# Patient Record
Sex: MALE | Race: ASIAN | NOT HISPANIC OR LATINO | URBAN - METROPOLITAN AREA
[De-identification: names, ages, dates, MRNs, and addresses within clinical notes are randomized per-mention and may not be internally consistent; named-entity substitution may affect disease eponyms.]

---

## 2024-01-21 ENCOUNTER — EMERGENCY (EMERGENCY)
Facility: HOSPITAL | Age: 29
LOS: 1 days | Discharge: HOME | End: 2024-01-21
Attending: EMERGENCY MEDICINE
Payer: MEDICAID

## 2024-01-21 VITALS
RESPIRATION RATE: 18 BRPM | OXYGEN SATURATION: 100 % | DIASTOLIC BLOOD PRESSURE: 67 MMHG | TEMPERATURE: 98 F | HEIGHT: 69 IN | HEART RATE: 110 BPM | WEIGHT: 154.98 LBS | SYSTOLIC BLOOD PRESSURE: 127 MMHG

## 2024-01-21 VITALS
HEART RATE: 82 BPM | SYSTOLIC BLOOD PRESSURE: 116 MMHG | DIASTOLIC BLOOD PRESSURE: 74 MMHG | OXYGEN SATURATION: 97 % | RESPIRATION RATE: 20 BRPM | TEMPERATURE: 98 F

## 2024-01-21 LAB
ALBUMIN SERPL ELPH-MCNC: 4.6 G/DL — SIGNIFICANT CHANGE UP (ref 3.3–5)
ALP SERPL-CCNC: 83 U/L — SIGNIFICANT CHANGE UP (ref 40–120)
ALT FLD-CCNC: 30 U/L — SIGNIFICANT CHANGE UP (ref 10–45)
ANION GAP SERPL CALC-SCNC: 14 MMOL/L — SIGNIFICANT CHANGE UP (ref 5–17)
ANISOCYTOSIS BLD QL: SLIGHT — SIGNIFICANT CHANGE UP
APTT BLD: 32.7 SEC — SIGNIFICANT CHANGE UP (ref 24.5–35.6)
AST SERPL-CCNC: 20 U/L — SIGNIFICANT CHANGE UP (ref 10–40)
BASOPHILS # BLD AUTO: 0.07 K/UL — SIGNIFICANT CHANGE UP (ref 0–0.2)
BASOPHILS NFR BLD AUTO: 0.9 % — SIGNIFICANT CHANGE UP (ref 0–2)
BILIRUB SERPL-MCNC: 0.4 MG/DL — SIGNIFICANT CHANGE UP (ref 0.2–1.2)
BLD GP AB SCN SERPL QL: NEGATIVE — SIGNIFICANT CHANGE UP
BUN SERPL-MCNC: 16 MG/DL — SIGNIFICANT CHANGE UP (ref 7–23)
CALCIUM SERPL-MCNC: 9.6 MG/DL — SIGNIFICANT CHANGE UP (ref 8.4–10.5)
CHLORIDE SERPL-SCNC: 102 MMOL/L — SIGNIFICANT CHANGE UP (ref 96–108)
CO2 SERPL-SCNC: 22 MMOL/L — SIGNIFICANT CHANGE UP (ref 22–31)
CREAT SERPL-MCNC: 1.03 MG/DL — SIGNIFICANT CHANGE UP (ref 0.5–1.3)
EGFR: 101 ML/MIN/1.73M2 — SIGNIFICANT CHANGE UP
EOSINOPHIL # BLD AUTO: 0.07 K/UL — SIGNIFICANT CHANGE UP (ref 0–0.5)
EOSINOPHIL NFR BLD AUTO: 0.9 % — SIGNIFICANT CHANGE UP (ref 0–6)
GLUCOSE SERPL-MCNC: 152 MG/DL — HIGH (ref 70–99)
HCT VFR BLD CALC: 42.3 % — SIGNIFICANT CHANGE UP (ref 39–50)
HGB BLD-MCNC: 14.4 G/DL — SIGNIFICANT CHANGE UP (ref 13–17)
HIV 1 & 2 AB SERPL IA.RAPID: SIGNIFICANT CHANGE UP
INR BLD: 1.12 RATIO — SIGNIFICANT CHANGE UP (ref 0.85–1.18)
LYMPHOCYTES # BLD AUTO: 1.89 K/UL — SIGNIFICANT CHANGE UP (ref 1–3.3)
LYMPHOCYTES # BLD AUTO: 24.6 % — SIGNIFICANT CHANGE UP (ref 13–44)
MAGNESIUM SERPL-MCNC: 2.2 MG/DL — SIGNIFICANT CHANGE UP (ref 1.6–2.6)
MANUAL SMEAR VERIFICATION: SIGNIFICANT CHANGE UP
MCHC RBC-ENTMCNC: 24.8 PG — LOW (ref 27–34)
MCHC RBC-ENTMCNC: 34 GM/DL — SIGNIFICANT CHANGE UP (ref 32–36)
MCV RBC AUTO: 72.9 FL — LOW (ref 80–100)
MICROCYTES BLD QL: SLIGHT — SIGNIFICANT CHANGE UP
MONOCYTES # BLD AUTO: 0.81 K/UL — SIGNIFICANT CHANGE UP (ref 0–0.9)
MONOCYTES NFR BLD AUTO: 10.5 % — SIGNIFICANT CHANGE UP (ref 2–14)
NEUTROPHILS # BLD AUTO: 4.65 K/UL — SIGNIFICANT CHANGE UP (ref 1.8–7.4)
NEUTROPHILS NFR BLD AUTO: 60.5 % — SIGNIFICANT CHANGE UP (ref 43–77)
OVALOCYTES BLD QL SMEAR: SLIGHT — SIGNIFICANT CHANGE UP
PHOSPHATE SERPL-MCNC: 3 MG/DL — SIGNIFICANT CHANGE UP (ref 2.5–4.5)
PLAT MORPH BLD: NORMAL — SIGNIFICANT CHANGE UP
PLATELET # BLD AUTO: 328 K/UL — SIGNIFICANT CHANGE UP (ref 150–400)
POIKILOCYTOSIS BLD QL AUTO: SLIGHT — SIGNIFICANT CHANGE UP
POTASSIUM SERPL-MCNC: 4.4 MMOL/L — SIGNIFICANT CHANGE UP (ref 3.5–5.3)
POTASSIUM SERPL-SCNC: 4.4 MMOL/L — SIGNIFICANT CHANGE UP (ref 3.5–5.3)
PROT SERPL-MCNC: 7.8 G/DL — SIGNIFICANT CHANGE UP (ref 6–8.3)
PROTHROM AB SERPL-ACNC: 12.3 SEC — SIGNIFICANT CHANGE UP (ref 9.5–13)
RBC # BLD: 5.8 M/UL — SIGNIFICANT CHANGE UP (ref 4.2–5.8)
RBC # FLD: 13.4 % — SIGNIFICANT CHANGE UP (ref 10.3–14.5)
RBC BLD AUTO: ABNORMAL
RH IG SCN BLD-IMP: POSITIVE — SIGNIFICANT CHANGE UP
SODIUM SERPL-SCNC: 138 MMOL/L — SIGNIFICANT CHANGE UP (ref 135–145)
VARIANT LYMPHS # BLD: 2.6 % — SIGNIFICANT CHANGE UP (ref 0–6)
WBC # BLD: 7.69 K/UL — SIGNIFICANT CHANGE UP (ref 3.8–10.5)
WBC # FLD AUTO: 7.69 K/UL — SIGNIFICANT CHANGE UP (ref 3.8–10.5)

## 2024-01-21 PROCEDURE — 86900 BLOOD TYPING SEROLOGIC ABO: CPT

## 2024-01-21 PROCEDURE — 71045 X-RAY EXAM CHEST 1 VIEW: CPT | Mod: 26

## 2024-01-21 PROCEDURE — 74177 CT ABD & PELVIS W/CONTRAST: CPT | Mod: 26,MA

## 2024-01-21 PROCEDURE — 85730 THROMBOPLASTIN TIME PARTIAL: CPT

## 2024-01-21 PROCEDURE — 96375 TX/PRO/DX INJ NEW DRUG ADDON: CPT | Mod: XU

## 2024-01-21 PROCEDURE — 93005 ELECTROCARDIOGRAM TRACING: CPT

## 2024-01-21 PROCEDURE — 80053 COMPREHEN METABOLIC PANEL: CPT

## 2024-01-21 PROCEDURE — 96374 THER/PROPH/DIAG INJ IV PUSH: CPT | Mod: XU

## 2024-01-21 PROCEDURE — 86301 IMMUNOASSAY TUMOR CA 19-9: CPT

## 2024-01-21 PROCEDURE — 71260 CT THORAX DX C+: CPT | Mod: MA

## 2024-01-21 PROCEDURE — 71260 CT THORAX DX C+: CPT | Mod: 26,MA

## 2024-01-21 PROCEDURE — 83735 ASSAY OF MAGNESIUM: CPT

## 2024-01-21 PROCEDURE — 84100 ASSAY OF PHOSPHORUS: CPT

## 2024-01-21 PROCEDURE — 85610 PROTHROMBIN TIME: CPT

## 2024-01-21 PROCEDURE — 87389 HIV-1 AG W/HIV-1&-2 AB AG IA: CPT

## 2024-01-21 PROCEDURE — 82378 CARCINOEMBRYONIC ANTIGEN: CPT

## 2024-01-21 PROCEDURE — 99285 EMERGENCY DEPT VISIT HI MDM: CPT | Mod: 25

## 2024-01-21 PROCEDURE — 74177 CT ABD & PELVIS W/CONTRAST: CPT | Mod: MA

## 2024-01-21 PROCEDURE — 71045 X-RAY EXAM CHEST 1 VIEW: CPT

## 2024-01-21 PROCEDURE — 86901 BLOOD TYPING SEROLOGIC RH(D): CPT

## 2024-01-21 PROCEDURE — 86850 RBC ANTIBODY SCREEN: CPT

## 2024-01-21 PROCEDURE — 85025 COMPLETE CBC W/AUTO DIFF WBC: CPT

## 2024-01-21 PROCEDURE — 86703 HIV-1/HIV-2 1 RESULT ANTBDY: CPT

## 2024-01-21 PROCEDURE — 99285 EMERGENCY DEPT VISIT HI MDM: CPT

## 2024-01-21 RX ORDER — ONDANSETRON 8 MG/1
4 TABLET, FILM COATED ORAL ONCE
Refills: 0 | Status: COMPLETED | OUTPATIENT
Start: 2024-01-21 | End: 2024-01-21

## 2024-01-21 RX ORDER — SUCRALFATE 1 G
1 TABLET ORAL ONCE
Refills: 0 | Status: COMPLETED | OUTPATIENT
Start: 2024-01-21 | End: 2024-01-21

## 2024-01-21 RX ORDER — SUCRALFATE 1 G
10 TABLET ORAL
Qty: 420 | Refills: 3
Start: 2024-01-21

## 2024-01-21 RX ORDER — ACETAMINOPHEN 500 MG
1000 TABLET ORAL ONCE
Refills: 0 | Status: COMPLETED | OUTPATIENT
Start: 2024-01-21 | End: 2024-01-21

## 2024-01-21 RX ORDER — MORPHINE SULFATE 50 MG/1
4 CAPSULE, EXTENDED RELEASE ORAL ONCE
Refills: 0 | Status: DISCONTINUED | OUTPATIENT
Start: 2024-01-21 | End: 2024-01-21

## 2024-01-21 RX ORDER — ONDANSETRON 8 MG/1
4 TABLET, FILM COATED ORAL ONCE
Refills: 0 | Status: DISCONTINUED | OUTPATIENT
Start: 2024-01-21 | End: 2024-01-21

## 2024-01-21 RX ORDER — SODIUM CHLORIDE 9 MG/ML
1000 INJECTION INTRAMUSCULAR; INTRAVENOUS; SUBCUTANEOUS ONCE
Refills: 0 | Status: COMPLETED | OUTPATIENT
Start: 2024-01-21 | End: 2024-01-21

## 2024-01-21 RX ORDER — OXYCODONE HYDROCHLORIDE 5 MG/1
1 TABLET ORAL
Qty: 12 | Refills: 0
Start: 2024-01-21 | End: 2024-01-23

## 2024-01-21 RX ADMIN — ONDANSETRON 4 MILLIGRAM(S): 8 TABLET, FILM COATED ORAL at 18:23

## 2024-01-21 RX ADMIN — MORPHINE SULFATE 4 MILLIGRAM(S): 50 CAPSULE, EXTENDED RELEASE ORAL at 18:23

## 2024-01-21 RX ADMIN — Medication 1 GRAM(S): at 23:39

## 2024-01-21 RX ADMIN — SODIUM CHLORIDE 1000 MILLILITER(S): 9 INJECTION INTRAMUSCULAR; INTRAVENOUS; SUBCUTANEOUS at 18:24

## 2024-01-21 RX ADMIN — Medication 1 GRAM(S): at 18:23

## 2024-01-21 RX ADMIN — Medication 400 MILLIGRAM(S): at 18:23

## 2024-01-21 NOTE — ED ADULT NURSE NOTE - NSFALLUNIVINTERV_ED_ALL_ED
Bed/Stretcher in lowest position, wheels locked, appropriate side rails in place/Call bell, personal items and telephone in reach/Instruct patient to call for assistance before getting out of bed/chair/stretcher/Non-slip footwear applied when patient is off stretcher/[facility] to call system/Physically safe environment - no spills, clutter or unnecessary equipment/Purposeful proactive rounding/Room/bathroom lighting operational, light cord in reach

## 2024-01-21 NOTE — CONSULT NOTE ADULT - SUBJECTIVE AND OBJECTIVE BOX
Patient is a 28y old  Male who presents with a chief complaint of     HPI:  28M No PMH, no PSH, presenting with worsening epigastric abdominal pain. Patient had an outpatient EGD today showing concerns for masses and erosions possible underlying malignancy in the lesser curve.      CT: Diffuse gastric wall thickening. Additional more focal thickening seen. Along the lesser curvature of the stomach distally, with associated. Ulceration and adjacent fat stranding. This is likely the site of the   patient's known ulcer/mass. There is no fluid collection or free air to suggest perforation.       Prominent gastrohepatic ligament lymph node. This is of uncertain nature.  This may be reactive or malignant in nature.      PAST MEDICAL & SURGICAL HISTORY:  H pylori ulcer          FAMILY HISTORY:      Vital Signs Last 24 Hrs  T(C): 36.6 (21 Jan 2024 23:39), Max: 36.6 (21 Jan 2024 23:39)  T(F): 97.8 (21 Jan 2024 23:39), Max: 97.8 (21 Jan 2024 23:39)  HR: 82 (21 Jan 2024 23:39) (82 - 110)  BP: 116/74 (21 Jan 2024 23:39) (116/74 - 127/67)  BP(mean): --  RR: 20 (21 Jan 2024 23:39) (18 - 20)  SpO2: 97% (21 Jan 2024 23:39) (97% - 100%)    Parameters below as of 21 Jan 2024 23:39  Patient On (Oxygen Delivery Method): room air        PHYSICAL EXAM:  Constitutional: well developed, well nourished, NAD  Respiratory: Unlabored breathing   Cardiovascular: RRR  Gastrointestinal: abdomen soft, TTP in epigastric area, nondistended. No obvious masses. No peritonitis  Extremities: FROM, warm  Neurological: intact, non-focal        LABS:                        14.4   7.69  )-----------( 328      ( 21 Jan 2024 17:45 )             42.3     01-21    138  |  102  |  16  ----------------------------<  152<H>  4.4   |  22  |  1.03    Ca    9.6      21 Jan 2024 17:45  Phos  3.0     01-21  Mg     2.2     01-21    TPro  7.8  /  Alb  4.6  /  TBili  0.4  /  DBili  x   /  AST  20  /  ALT  30  /  AlkPhos  83  01-21    PT/INR - ( 21 Jan 2024 17:45 )   PT: 12.3 sec;   INR: 1.12 ratio         PTT - ( 21 Jan 2024 17:45 )  PTT:32.7 sec  Urinalysis Basic - ( 21 Jan 2024 17:45 )    Color: x / Appearance: x / SG: x / pH: x  Gluc: 152 mg/dL / Ketone: x  / Bili: x / Urobili: x   Blood: x / Protein: x / Nitrite: x   Leuk Esterase: x / RBC: x / WBC x   Sq Epi: x / Non Sq Epi: x / Bacteria: x        RADIOLOGY & ADDITIONAL STUDIES: Patient is a 28y old  Male who presents with a chief complaint of     HPI:  28M No PMH, no PSH, Past family history of gastric disease in fathers side, not sure exactly what disease. Presenting with worsening epigastric abdominal pain. Patient had an outpatient EGD today showing concerns for masses and erosions possible underlying malignancy in the lesser curve.      CT: Diffuse gastric wall thickening. Additional more focal thickening seen. Along the lesser curvature of the stomach distally, with associated. Ulceration and adjacent fat stranding. This is likely the site of the   patient's known ulcer/mass. There is no fluid collection or free air to suggest perforation.       Prominent gastrohepatic ligament lymph node. This is of uncertain nature.  This may be reactive or malignant in nature.      PAST MEDICAL & SURGICAL HISTORY:  H pylori ulcer          FAMILY HISTORY:      Vital Signs Last 24 Hrs  T(C): 36.6 (21 Jan 2024 23:39), Max: 36.6 (21 Jan 2024 23:39)  T(F): 97.8 (21 Jan 2024 23:39), Max: 97.8 (21 Jan 2024 23:39)  HR: 82 (21 Jan 2024 23:39) (82 - 110)  BP: 116/74 (21 Jan 2024 23:39) (116/74 - 127/67)  BP(mean): --  RR: 20 (21 Jan 2024 23:39) (18 - 20)  SpO2: 97% (21 Jan 2024 23:39) (97% - 100%)    Parameters below as of 21 Jan 2024 23:39  Patient On (Oxygen Delivery Method): room air        PHYSICAL EXAM:  Constitutional: well developed, well nourished, NAD  Respiratory: Unlabored breathing   Cardiovascular: RRR  Gastrointestinal: abdomen soft, TTP in epigastric area, nondistended. No obvious masses. No peritonitis  Extremities: FROM, warm  Neurological: intact, non-focal        LABS:                        14.4   7.69  )-----------( 328      ( 21 Jan 2024 17:45 )             42.3     01-21    138  |  102  |  16  ----------------------------<  152<H>  4.4   |  22  |  1.03    Ca    9.6      21 Jan 2024 17:45  Phos  3.0     01-21  Mg     2.2     01-21    TPro  7.8  /  Alb  4.6  /  TBili  0.4  /  DBili  x   /  AST  20  /  ALT  30  /  AlkPhos  83  01-21    PT/INR - ( 21 Jan 2024 17:45 )   PT: 12.3 sec;   INR: 1.12 ratio         PTT - ( 21 Jan 2024 17:45 )  PTT:32.7 sec  Urinalysis Basic - ( 21 Jan 2024 17:45 )    Color: x / Appearance: x / SG: x / pH: x  Gluc: 152 mg/dL / Ketone: x  / Bili: x / Urobili: x   Blood: x / Protein: x / Nitrite: x   Leuk Esterase: x / RBC: x / WBC x   Sq Epi: x / Non Sq Epi: x / Bacteria: x        RADIOLOGY & ADDITIONAL STUDIES:

## 2024-01-21 NOTE — ED PROVIDER NOTE - PHYSICAL EXAMINATION
Constitutional: Well nourished, well developed, appears stated age.   Eyes: PERRL, EOMI. No scleral icterus  HENT: Moist mucous membranes.   Neck: Supple. No goiter.   CV: RRR, no m/r/g. Well perfused extremities.   RESP: Lungs CTAB, normal respiratory effort  GI: voluntary guarding with TTP of the epigastric, RUQ and LUQ.   MSK: Moving all four extremities. No obvious deformity  Skin: Warm, dry, no rashes  Neuro: Alert and oriented. Normal strength and sensation of UEs and LEs  Psych: Appropriate mood and affect

## 2024-01-21 NOTE — CONSULT NOTE ADULT - ASSESSMENT
28M with worsening epigastric abdominal pain. EGD and CT scan suspicious for lesser curvature malignancy and multiple gastric ulcers     PLAN:  - No acute surgical intervention  - Patient has an outpatient Follow Up with Dr Syed ([phone] ) at -[address] [place] [address], [address], [place], [place]  - Recommend discharge with Omeprazole sucralfate, pain medication.         Xiomara Madden PGY2  Red Team surgery  [id number]

## 2024-01-21 NOTE — ED PROVIDER NOTE - PROGRESS NOTE DETAILS
Attending note (Sandro): Case discussed with surgery team who saw for surgery/oncology service.  Patient has appointment Tuesday at 9 AM with surgeon onc.  Results of CT discussed with patient including concern for gastric thickening and lymphadenopathy.  Recommended follow-up with his GI doctor, surgeon/ONC and oncology (will send email to refer through cancer care direct).  Patient felt much better after sucralfate and medications here.  Will give prescription for sucralfate and have recommended increasing omeprazole to twice daily.  Additionally will give prescription for oxycodone for breakthrough pain.  Recommended bland diet and had extensive discussion counseling patient on need for follow-up and return precautions for any worsening pain inability to tolerate p.o. or hematochezia/hematemesis.

## 2024-01-21 NOTE — ED PROVIDER NOTE - ATTENDING CONTRIBUTION TO CARE
Attending Statement (MELONIE Jo MD):    HPI: 28-year-old male with no reported medical comorbidities complaining of severe abdominal pain that has been occurring intermittently over the past several months but worsening more recently.  Patient reports that he had a positive breath testing for H. pylori and completed course of antibiotics.  Patient reports symptoms had not improved and had then had endoscopy today which noted multiple lesions concern for gastric mass erosions and bleeding, biopsies were taken and following procedure patient reports immediately worsening of pain, and he was told to go to an emergency department for further evaluation.  Family history of "gastric problems "and multiple members on father side which is resulted in their deaths typically age 50-60.  Is also reports past 5 days of dark tarry stools with occasional blood.    Review of Systems:  -General: no fever   -ENT: no congestion  -Pulmonary: no cough, no shortness of breath  -Cardiac: no chest pain  -Gastrointestinal: See HPI  -Genitourinary: no blood or pain with urination  -Musculoskeletal: no back or neck pain  -Skin: no rashes  -Endocrine: No h/o diabetes  -Neurologic: No new weakness or numbness in extremities    All else negative unless otherwise specified elsewhere in this note.    On Physical Exam:  General: well appearing, in NAD, speaking clearly in full sentences and without difficulty; cooperative with exam  HEENT: anicteric sclera, airway patent  Neck: no JVD  Cardiac: regular, s1 s2  Lungs: CTABL  Abdomen: Diffuse abdominal tenderness but soft and nondistended.  Most tender in epigastrium, no rebound or guarding.  : no bladder tenderness or distension  Skin: intact, no rash  Extremities: no peripheral edema, no gross deformities      MDM: 28-year-old male presenting with worsening abdominal pain with EGD today showing concerns for masses and erosions possible underlying malignancy.  Need evaluation with CT chest abdomen pelvis to evaluate for evidence of perforation or other masses/malignancy or other acute pathology.  Abdomen does not seem consistent with large perforation and stat chest x-ray obtained upright at bedside does not show evidence of free air under diaphragm.  Will obtain screening labs including CBC CMP offer HIV testing, offer pain medication as needed, and consider admission for further evaluation pending results of labs and imaging.

## 2024-01-21 NOTE — ED PROVIDER NOTE - PATIENT PORTAL LINK FT
You can access the FollowMyHealth Patient Portal offered by [facility] by registering at the following website: [email or web address]. By joining [facility]’s FollowMyHealth portal, you will also be able to view your health information using other applications (apps) compatible with our system.

## 2024-01-21 NOTE — ED ADULT NURSE NOTE - OBJECTIVE STATEMENT
28 year old male presented to ED from home with c/o of severe 10/10 epigastric pain x2 weeks with decreased PO intake and bloody stool x3 months. 3 months ago pt went to GI doctor and diagnosed with H. Pylori and finished treatment. Sharp burning epigastric pain started 2 weeks ago, went to GI and received imaging showing a malignant neoplasm. hx lyme disease. pt denies CP, SOB, nausea/vomiting, numbness/tingling, fever, cough, chills, dizziness, headache, blurred vision, neuro intact. pt a&ox3, lung sounds clear, heart rate regular, abdomen soft nontender nondistended to palp. skin intact. IV in LAC 20G and patent. Will continue to monitor and assess while offering support and reassurance.

## 2024-01-21 NOTE — ED PROVIDER NOTE - CARE PROVIDER_API CALL
Kunal Dao, [facility]  Surgery  [address] [facility], Division [place] [address]  Phone: [phone]  Fax: [phone]  Follow Up Time:

## 2024-01-21 NOTE — ED ADULT NURSE NOTE - CHIEF COMPLAINT QUOTE
Endoscopy outpatient in [place] this afternoon, went home, ate an egg, now having epigastric pain.

## 2024-01-21 NOTE — ED PROVIDER NOTE - CLINICAL SUMMARY MEDICAL DECISION MAKING FREE TEXT BOX
8-year-old male with history of H. pylori treatment, presenting with 5 days of melena and severe epigastric pain in the setting of recent endoscopy showing fungating mass concerning for gastric cancer.  Differential includes but is not limited to esophageal perforation, gastric perforation, also considered anemia from significant bleed, endoscopy photos highly concerning for gastric cancer, liver biopsy is pending.  Will plan for CT chest abdomen pelvis with oral and IV contrast, will treat symptomatically for pain with both IV Tylenol and IV morphine, will give IV fluids, obtain type and screen INR CBC CMP electrolytes, and cancer antigens CEA and CA 19 9 as well as consult gastroenterology. 28-year-old male with history of H. pylori treatment, presenting with 5 days of melena and severe epigastric pain in the setting of recent endoscopy showing fungating mass concerning for gastric cancer.  Differential includes but is not limited to esophageal perforation, gastric perforation, also considered anemia from significant bleed, endoscopy photos highly concerning for gastric cancer, liver biopsy is pending.  Will plan for CT chest abdomen pelvis with oral and IV contrast, will treat symptomatically for pain with both IV Tylenol and IV morphine, will give IV fluids, obtain type and screen INR CBC CMP electrolytes, and cancer antigens CEA and CA 19 9 as well as consult gastroenterology. 28-year-old male with history of H. pylori treatment, presenting with 5 days of melena and severe epigastric pain in the setting of recent endoscopy showing fungating mass concerning for gastric cancer.  Differential includes but is not limited to esophageal perforation, gastric perforation, also considered anemia from significant bleed, endoscopy photos highly concerning for gastric cancer, gastric biopsy is pending.  Will plan for CT chest abdomen pelvis with oral and IV contrast, will treat symptomatically for pain with both IV Tylenol and IV morphine, will give IV fluids, obtain type and screen INR CBC CMP electrolytes, and cancer antigens CEA and CA 19 9 as well as consult gastroenterology.

## 2024-01-21 NOTE — ED PROVIDER NOTE - OBJECTIVE STATEMENT
28-year-old male with no significant past medical history presenting with 5 days of melena and severe epigastric pain 28-year-old male with no significant past medical history presenting with 5 days of melena and severe epigastric painStarting shortly after eating an egg after getting an endoscopy earlier today.  Patient coming in with endoscopy photos from gastroenterology care [facility]. where he received endoscopy with bleeding ulcerating mass found as well as possible Candida.  Patient was recommended to come into the emergency department given severe pain but she developed later on in the day.  Denies any recent fevers or chills.  States that he has had severe epigastric pain for the past several months, and has recently been treated for H. pylori with improvement in his symptoms.  However symptoms have returned recently for which endoscopy was done.  Biopsy of the mass was taken.  Denies any hematemesis.  No chest pain, shortness of breath, fevers or chills, nausea or vomiting, diarrhea, urinary symptoms or rashes.

## 2024-01-21 NOTE — ED PROVIDER NOTE - NSFOLLOWUPINSTRUCTIONS_ED_ALL_ED_FT
You were evaluated in the Emergency Department for abdominal pain.  You were evaluated and examined by a physician, and you had lab testing and a CT scan of your abdomen.  You were given medications for your pain.      Based on your evaluation: there is concern for a mass in the stomach; you will need additional testing to determine what this is; this may be cancer.    We recommend that you:  1. See your primary care physician within the next 72 hours for follow up.  Bring a copy of your discharge paperwork (including any test results) to your doctor.  2. Take Omeprazole 40mg, twice daily (in morning and evening/night).  3. Take Sucralfate 10 mL, every 6 hours as needed for pain or before eating.  You may also take Oycodone, 5mg tablet, up to every 6 hours as needed for breakthrough pain (do not drive while taking this medication).  4. Avoid any spicy or acidic food.  5. Go to the appointment with Dr Syed on Tuesday 1/23/24 at 9:00AM      *** Return immediately if you have severe/worsening pain, cannot eat/drink, begin vomiting blood, have bloody or black/tar-like stools, or develop any other new/concerning symptoms. ***

## 2024-01-22 ENCOUNTER — NON-APPOINTMENT (OUTPATIENT)
Age: 29
End: 2024-01-22

## 2024-01-22 PROBLEM — Z00.00 ENCOUNTER FOR PREVENTIVE HEALTH EXAMINATION: Status: ACTIVE | Noted: 2024-01-22

## 2024-01-22 LAB
CANCER AG19-9 SERPL-ACNC: 8 U/ML — SIGNIFICANT CHANGE UP
CEA SERPL-MCNC: 0.8 NG/ML — SIGNIFICANT CHANGE UP (ref 0–3.8)
HIV 1+2 AB+HIV1 P24 AG SERPL QL IA: SIGNIFICANT CHANGE UP

## 2024-01-23 ENCOUNTER — APPOINTMENT (OUTPATIENT)
Dept: SURGICAL ONCOLOGY | Facility: CLINIC | Age: 29
End: 2024-01-23
Payer: MEDICAID

## 2024-01-23 ENCOUNTER — NON-APPOINTMENT (OUTPATIENT)
Age: 29
End: 2024-01-23

## 2024-01-23 VITALS
BODY MASS INDEX: 23.25 KG/M2 | WEIGHT: 157 LBS | OXYGEN SATURATION: 98 % | DIASTOLIC BLOOD PRESSURE: 77 MMHG | HEIGHT: 69 IN | SYSTOLIC BLOOD PRESSURE: 118 MMHG | HEART RATE: 92 BPM

## 2024-01-23 PROBLEM — K27.9 PEPTIC ULCER, SITE UNSPECIFIED, UNSPECIFIED AS ACUTE OR CHRONIC, WITHOUT HEMORRHAGE OR PERFORATION: Chronic | Status: ACTIVE | Noted: 2024-01-21

## 2024-01-23 PROCEDURE — 99203 OFFICE O/P NEW LOW 30 MIN: CPT

## 2024-01-24 ENCOUNTER — APPOINTMENT (OUTPATIENT)
Dept: GASTROENTEROLOGY | Facility: CLINIC | Age: 29
End: 2024-01-24
Payer: MEDICAID

## 2024-01-24 VITALS
SYSTOLIC BLOOD PRESSURE: 107 MMHG | BODY MASS INDEX: 23.25 KG/M2 | HEIGHT: 69 IN | WEIGHT: 157 LBS | HEART RATE: 87 BPM | DIASTOLIC BLOOD PRESSURE: 73 MMHG

## 2024-01-24 DIAGNOSIS — R59.0 LOCALIZED ENLARGED LYMPH NODES: ICD-10-CM

## 2024-01-24 DIAGNOSIS — Z86.19 PERSONAL HISTORY OF OTHER INFECTIOUS AND PARASITIC DISEASES: ICD-10-CM

## 2024-01-24 DIAGNOSIS — Z78.9 OTHER SPECIFIED HEALTH STATUS: ICD-10-CM

## 2024-01-24 DIAGNOSIS — R59.1 GENERALIZED ENLARGED LYMPH NODES: ICD-10-CM

## 2024-01-24 DIAGNOSIS — K31.89 OTHER DISEASES OF STOMACH AND DUODENUM: ICD-10-CM

## 2024-01-24 PROCEDURE — 99204 OFFICE O/P NEW MOD 45 MIN: CPT

## 2024-01-24 RX ORDER — OMEPRAZOLE 40 MG/1
CAPSULE, DELAYED RELEASE ORAL
Refills: 0 | Status: ACTIVE | COMMUNITY

## 2024-01-24 RX ORDER — SUCRALFATE 1 G/10ML
1 SUSPENSION ORAL
Refills: 0 | Status: ACTIVE | COMMUNITY

## 2024-01-24 NOTE — CONSULT LETTER
[Dear  ___] : Dear  [unfilled], [Consult Letter:] : I had the pleasure of evaluating your patient, [unfilled]. [Consult Closing:] : Thank you very much for allowing me to participate in the care of this patient.  If you have any questions, please do not hesitate to contact me. [Sincerely,] : Sincerely, [( Thank you for referring [unfilled] for consultation for _____ )] : Thank you for referring [unfilled] for consultation for [unfilled] [Please see my note below.] : Please see my note below. [FreeTextEntry3] : eJronimo Syed MD, FICS, FACS Director of Surgical Oncology- [facility] , Department of Surgery [facility] [employer] [facility] at [address] [address]    (mob) [phone] (o) [phone] (f) [phone]

## 2024-01-24 NOTE — ASSESSMENT
[FreeTextEntry1] : IMP: 27 yo M recently seen in the ED for epigastric/abdominal pain, S/P outpatient EGD/biopsy with GI for gastric mass, also noted on CT.   CT Chest A/P, 01/21/24: diffuse gastric wall thickening. Additional more focal thickening seen along the lesser curvature of the stomach distally, with associated ulceration and adjacent fat stranding. This is likely the site of the patient's known ulcer/mass. There is no fluid collection or free air to suggest perforation. Correlate with biopsy results.  Prominent gastro hepatic LN; uncertain nature. This may be reactive or malignant in nature.    PLAN:  - biopsy results pending to confirm diagnosis  - discussed plan for EUS /staging, with Dr. Aldridge; and diagnostic laparoscopy w peritoneal washings @ [facility].  - results of CT reviewed and discussed w the patient and family at length  - pending biopsy results, possible surgical intervention vs neoadjuvant treatment discussed - genetic testing; referred to Stella Rob  - follow up with med onc, Dr. Latham     I have discussed the diagnosis, therapeutic plan and options with the patient at length. Patient expressed verbal understanding to proceed with proposed plan. All questions answered.

## 2024-01-24 NOTE — REASON FOR VISIT
[Initial Consultation] : an initial consultation for [Spouse] : spouse [Family Member] : family member [FreeTextEntry2] : malignant neoplasm, stomach  Burow's Advancement Flap Text: The defect edges were debeveled with a #15 scalpel blade.  Given the location of the defect and the proximity to free margins a Burow's advancement flap was deemed most appropriate.  Using a sterile surgical marker, the appropriate advancement flap was drawn incorporating the defect and placing the expected incisions within the relaxed skin tension lines where possible.    The area thus outlined was incised deep to adipose tissue with a #15 scalpel blade.  The skin margins were undermined to an appropriate distance in all directions utilizing iris scissors.

## 2024-01-24 NOTE — HISTORY OF PRESENT ILLNESS
[de-identified] : Mr. REBOLLAR is a 28 year y/o M here for initial consultation visit, for gastric mass. Patient S/P outpatient EGD/biopsy with GI, Dr. Cervantes, 24.  Patient was seen in the hospital/ED, earlier this week, for gastric ulcer; w cc of having worsening abdominal /epigastric pain.  Patient states he had initially noticed some blood in the stool, and went to urgent care, and referred to Dr. Cervantes.  Prior to endoscopy, he was experiencing some sharp RUQ pain, radiating to the back. Patient was prescribed Omeprazole from [facility]. Abdominal US was done to R/O gallstones, @[facility], 24-- normal study.   Patient lost approx 5-6 lbs of weight loss, recently, which he has not noticed. On treatment for H. Pylori.  No significant PMH.  Home meds: Omeprazole.  No smoking/etoh history.  Works in real estate.   FAMHX: Father side of family- w history of gastric disease, paternal uncle  2/2 GI disease, unknown/unspecified.  Mother- HTN.   Imaging:  CXR, 24: clear lungs. no evidence of pneumoperitoneum.  CT Chest A/P, 24: diffuse gastric wall thickening. Additional more focal thickening seen along the lesser curvature of the stomach distally, with associated ulceration and adjacent fat stranding. This is likely the site of the patient's known ulcer/mass. There is no fluid collection or free air to suggest perforation. Correlate with biopsy results.  Prominent gastro hepatic LN; uncertain nature. This may be reactive or malignant in nature.   CEA, , from 24 within normal range.

## 2024-01-24 NOTE — REVIEW OF SYSTEMS
[Negative] : Heme/Lymph [FreeTextEntry8] : has some RUQ abdominal discomfort, radiating to the back. notes some blood in stool

## 2024-01-24 NOTE — RESULTS/DATA
[FreeTextEntry1] : Patient: GENOVEVA REBOLLAR YOB: 1995 Phone: [phone] MRN: [id number] Acc: [id number] Date of Exam: 01- EXAM:  ULTRASOUND ABDOMEN COMPLETE HISTORY:  Male, 28 years-old with epigastric pain for one month, right upper quadrant pain for 2 weeks TECHNIQUE:  Using real-time ultrasonography with a high-resolution broadband phased-array curved transducer, multiplanar gray scale images were obtained and supplemented with color Doppler. Static images are provided for review.  COMPARISON:  None  FINDINGS:   Abdominal Aorta/IVC: No evidence of abdominal aortic aneurysm. Visualized portions of the IVC were patent.  Liver:  Normal in size, morphology and contour. The visualized portion of portal and hepatic veins are unremarkable. No intrahepatic biliary duct dilatation. The liver has normal echogenicity.  Gallbladder: Normally distended with no stones, wall thickening, or sonographic Stiles sign.  Common Duct: Normal measuring 0.1 cm diameter at the natalie hepatis.  Pancreas: The visualized portion of the body of the pancreas is within normal limits. The tail is obscured by overlying bowel gas. No pancreatic duct dilatation.  Kidneys: Normal in size, morphology and cortical echotexture. There is no suspicious renal lesion. No hydronephrosis, shadowing calculi or perinephric fluid. Right Kidney: 10.4 cm in length. Left Kidney:   11.3 cm in length.  Spleen: Normal size, contour and echotexture measuring 8.5 cm in length.  IMPRESSION: Normal study

## 2024-01-25 ENCOUNTER — NON-APPOINTMENT (OUTPATIENT)
Age: 29
End: 2024-01-25

## 2024-01-29 ENCOUNTER — OUTPATIENT (OUTPATIENT)
Dept: OUTPATIENT SERVICES | Facility: HOSPITAL | Age: 29
LOS: 1 days | End: 2024-01-29
Payer: MEDICAID

## 2024-01-29 ENCOUNTER — APPOINTMENT (OUTPATIENT)
Dept: GASTROENTEROLOGY | Facility: HOSPITAL | Age: 29
End: 2024-01-29

## 2024-01-29 ENCOUNTER — TRANSCRIPTION ENCOUNTER (OUTPATIENT)
Age: 29
End: 2024-01-29

## 2024-01-29 ENCOUNTER — RESULT REVIEW (OUTPATIENT)
Age: 29
End: 2024-01-29

## 2024-01-29 VITALS
HEART RATE: 85 BPM | DIASTOLIC BLOOD PRESSURE: 87 MMHG | SYSTOLIC BLOOD PRESSURE: 108 MMHG | RESPIRATION RATE: 18 BRPM | OXYGEN SATURATION: 98 %

## 2024-01-29 VITALS
HEIGHT: 69 IN | SYSTOLIC BLOOD PRESSURE: 105 MMHG | RESPIRATION RATE: 14 BRPM | TEMPERATURE: 98 F | OXYGEN SATURATION: 100 % | WEIGHT: 156.97 LBS | DIASTOLIC BLOOD PRESSURE: 77 MMHG | HEART RATE: 84 BPM

## 2024-01-29 DIAGNOSIS — K31.89 OTHER DISEASES OF STOMACH AND DUODENUM: ICD-10-CM

## 2024-01-29 DIAGNOSIS — Z98.890 OTHER SPECIFIED POSTPROCEDURAL STATES: Chronic | ICD-10-CM

## 2024-01-29 PROCEDURE — 43239 EGD BIOPSY SINGLE/MULTIPLE: CPT

## 2024-01-29 PROCEDURE — 43259 EGD US EXAM DUODENUM/JEJUNUM: CPT

## 2024-01-29 PROCEDURE — 88312 SPECIAL STAINS GROUP 1: CPT | Mod: 26

## 2024-01-29 PROCEDURE — 43237 ENDOSCOPIC US EXAM ESOPH: CPT

## 2024-01-29 PROCEDURE — 43236 UPPR GI SCOPE W/SUBMUC INJ: CPT | Mod: 59

## 2024-01-29 PROCEDURE — 88312 SPECIAL STAINS GROUP 1: CPT

## 2024-01-29 PROCEDURE — 88305 TISSUE EXAM BY PATHOLOGIST: CPT | Mod: 26

## 2024-01-29 PROCEDURE — 88305 TISSUE EXAM BY PATHOLOGIST: CPT

## 2024-01-30 ENCOUNTER — APPOINTMENT (OUTPATIENT)
Dept: HEMATOLOGY ONCOLOGY | Facility: CLINIC | Age: 29
End: 2024-01-30

## 2024-01-30 PROBLEM — A69.20 LYME DISEASE, UNSPECIFIED: Chronic | Status: ACTIVE | Noted: 2024-01-29

## 2024-01-31 ENCOUNTER — NON-APPOINTMENT (OUTPATIENT)
Age: 29
End: 2024-01-31

## 2024-01-31 LAB — SURGICAL PATHOLOGY STUDY: SIGNIFICANT CHANGE UP

## 2024-01-31 NOTE — DISCUSSION/SUMMARY
[FreeTextEntry1] : REASON FOR CONSULT Sunitha Orlando is a 28-year-old male who was referred by Dr. Jeronimo Syed for cancer genetic counseling and risk assessment due to a new diagnosis of lymphoma. He was accompanied by his wife and his parents.  RELEVANT MEDICAL HISTORY Mr. 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 Patient presented a pathology report which revealed diffuse large B-cell lymphoma with high grade features, but this report is not yet available in the medical records. Referring provider stated that an additional biopsy is yet to be performed to help determine treatment plan and staging.   OTHER MEDICAL AND SURGICAL HISTORY: -	Medical History: Lyme disease -	Surgical History: nasal septum surgery  CANCER SCREENING HISTORY:   Colon: -	Colonoscopy: No -	Upper Endoscopy: 01/2024, malignant-appearing ulcerated gastric mass. -	EUS: last 01/2024, known gastric lymphoma, endosonographically T3N+ lesion. Pathology pending. Patient presented a pathology report which revealed B-cell lymphoma, but this report is not yet available in the medical records. Prostate: -	PSA: No -	BYRON: No Skin:   -	FBSE: No -	Lesions biopsied/removed: No  SOCIAL HISTORY: -	Tobacco-product use: No  FAMILY HISTORY: Maternal ancestry and paternal ancestry were reported as [place]. Ashkenazi Advent ancestry and consanguinity were denied. A detailed family history of cancer was ascertained. Relevant diagnoses are detailed below and in the scanned pedigree.   To Mr. Orlando's knowledge, no one in the family has had germline testing for cancer susceptibility.   	 	RISK ASSESSMENT: Mr. Orlando's personal history of lymphoma and/or family history of breast cancer is suggestive of an inherited predisposition to lymphoma and/or breast cancer and related cancers. We recommended genetic testing for genes associated with lymphoma, breast cancer, and gynecological cancers. This test analyzes [54] genes: ADA, VIBHA, BARD1, BLM, BRCA1, BRCA2, BRIP1, CARD11, CARMIL2, CASP8, CD27, CDH1, CHEK2, CTLA4, CTPS1, DOCK8, EPCAM, FADD, FAS, FASLG, FCHO1, IKZF1, IL2RA, IL2RB, ITK, MAGT1, MCM4, MLH1, MSH2, MSH6, NBN, NF1, PALB2, PIK3CD, PIK3R1, PMS2, PRKCD, PTEN, RAC2, RAD51C, RAD51D, RASGRP1, RHOH, RMRP, SH2D1A, STAT3, STK4, STK11, STXBP2, EEENIO22Z, TP53, TPP2, WAS, and XIAP.  We discussed the risks, benefits and limitations, and implications of genetic testing. We also discussed the psychosocial implications of genetic testing. Possible test results were reviewed with Mr. Orlando, along with associated medical management options.   Mr. Orlando consented to the above-mentioned genetic testing panel. A saliva sample was given in our laboratory and sent to [facility] today. However, the staging of Mr. Oralndo's lymphoma diagnosis is not confirmed, so his sample was placed on hold until we are able to confirm that the diagnosis is not Stage IV. If staging reveals his lymphoma diagnosis is Stage IV, Mr. Orlando will be contacted to provide a skin biopsy as there would be limitations pursuing genetic testing via saliva sample due to hematologic involvement of his lymphoma diagnosis.   PLAN:  1.	Saliva sample given today was sent to [facility].  2.	We will wait for staging of Mr. Orlando's diagnosis prior to removing the hold on his genetic testing and proceeding with the abovementioned panel. 3.	Once the hold is removed on his genetic testing, results will be returned in 2-3 weeks and we will contact Mr. Orlando.  For any additional questions please call [facility] at [phone].    Stella Rob MS, [facility] Genetic Counselor, [facility]   CC:  Dr. Jeronimo Syed

## 2024-02-02 PROBLEM — C85.99 GASTRIC LYMPHOMA: Status: ACTIVE | Noted: 2024-02-01

## 2024-02-02 NOTE — CONSULT LETTER
[FreeTextEntry3] : Jeronimo Syed MD, FICS, FACS, FAYE Director of Surgical Oncology- [facility] , Department of Surgery [facility] and [facility] [employer] at [address] [address]   (mob) [phone] (o) [phone] (f) [phone]

## 2024-02-02 NOTE — HISTORY OF PRESENT ILLNESS
[de-identified] : Mr. REBOLLAR is a 28 year y/o M presenting with gastric mass. Patient S/P outpatient EGD/biopsy with GI, Dr. Cervantes, 24.  Patient lost approx 5-6 lbs of weight loss, recently, which he has not noticed. On treatment for H. Pylori.  No significant PMH.  Home meds: Omeprazole.  No smoking/etoh history.  Works in real estate.  FAMHX: Father side of family- w history of gastric disease, paternal uncle  2/2 GI disease, unknown/unspecified.  Mother- HTN.   Patient was seen in the hospital/ED, earlier this week, for gastric ulcer; w cc of having worsening abdominal /epigastric pain.  Patient states he had initially noticed some blood in the stool, and went to urgent care, and referred to Dr. Cervantes.  Prior to endoscopy, he was experiencing some sharp RUQ pain, radiating to the back. Patient was prescribed Omeprazole from [facility]. Abdominal US was done to R/O gallstones, @[facility], 24-- normal study.   Imaging:  CXR, 24: clear lungs. no evidence of pneumoperitoneum.  CT Chest A/P, 24: diffuse gastric wall thickening. Additional more focal thickening seen along the lesser curvature of the stomach distally, with associated ulceration and adjacent fat stranding. This is likely the site of the patient's known ulcer/mass. There is no fluid collection or free air to suggest perforation. Correlate with biopsy results.  Prominent gastro hepatic LN; uncertain nature. This may be reactive or malignant in nature.   24: CEA,  WNL  2024: EGD/EUS: The gastric mass was evaluated using a radial echoendoscope. It was hypoechoic and appeared to invade through the muscularis propria consistent with a T3 lesion. There were a few scattered hypoechoic, round, well-defined lymph nodes surrounding the mass and at the gastrohepatic region measuring from 4 mm to 10 mm in maximal dimensions. Low yield for biopsy given small size and known gastric lymphoma.  Onc: Dr. Latham

## 2024-02-02 NOTE — REVIEW OF SYSTEMS
[FreeTextEntry8] : has some RUQ abdominal discomfort, radiating to the back. notes some blood in stool

## 2024-02-02 NOTE — ASSESSMENT
[FreeTextEntry1] : IMP: 29 yo M recently seen in the ED for epigastric/abdominal pain, S/P outpatient EGD/biopsy with GI for gastric mass, also noted on CT s/p EGD/EUS with biopsy revealing gastric lymphoma T3N+   PLAN:  - Patient saw Stella Rob for GT - discussed plan for EUS /staging, with Dr. Aldridge; and diagnostic laparoscopy w peritoneal washings @ [facility].  - results of CT reviewed and discussed w the patient and family at length  - pending biopsy results, possible surgical intervention vs neoadjuvant treatment discussed - follow up with med onc, Dr. Latham   - RTO  I have discussed the diagnosis, therapeutic plan and options with the patient at length. Patient expressed verbal understanding to proceed with proposed plan. All questions answered.

## 2024-02-02 NOTE — RESULTS/DATA
[FreeTextEntry1] : Patient: GENOVEVA REBOLLAR YOB: 1995 Phone: [phone] MRN: [id number] Acc: [id number] Date of Exam: 01- EXAM:  ULTRASOUND ABDOMEN COMPLETE HISTORY:  Male, 28 years-old with epigastric pain for one month, right upper quadrant pain for 2 weeks TECHNIQUE:  Using real-time ultrasonography with a high-resolution broadband phased-array curved transducer, multiplanar gray scale images were obtained and supplemented with color Doppler. Static images are provided for review.  COMPARISON:  None  FINDINGS:   Abdominal Aorta/IVC: No evidence of abdominal aortic aneurysm. Visualized portions of the IVC were patent.  Liver:  Normal in size, morphology and contour. The visualized portion of portal and hepatic veins are unremarkable. No intrahepatic biliary duct dilatation. The liver has normal echogenicity.  Gallbladder: Normally distended with no stones, wall thickening, or sonographic Stiles sign.  Common Duct: Normal measuring 0.1 cm diameter at the natalie hepatis.  Pancreas: The visualized portion of the body of the pancreas is within normal limits. The tail is obscured by overlying bowel gas. No pancreatic duct dilatation.  Kidneys: Normal in size, morphology and cortical echotexture. There is no suspicious renal lesion. No hydronephrosis, shadowing calculi or perinephric fluid. Right Kidney: 10.4 cm in length. Left Kidney:   11.3 cm in length.  Spleen: Normal size, contour and echotexture measuring 8.5 cm in length.  IMPRESSION: Normal study

## 2024-02-07 ENCOUNTER — APPOINTMENT (OUTPATIENT)
Dept: SURGICAL ONCOLOGY | Facility: CLINIC | Age: 29
End: 2024-02-07

## 2024-02-07 DIAGNOSIS — C85.99 NON-HODGKIN LYMPHOMA, UNSPECIFIED, EXTRANODAL AND SOLID ORGAN SITES: ICD-10-CM

## 2024-02-08 ENCOUNTER — APPOINTMENT (OUTPATIENT)
Dept: NUCLEAR MEDICINE | Facility: CLINIC | Age: 29
End: 2024-02-08

## 2024-02-16 ENCOUNTER — NON-APPOINTMENT (OUTPATIENT)
Age: 29
End: 2024-02-16

## 2024-02-20 ENCOUNTER — NON-APPOINTMENT (OUTPATIENT)
Age: 29
End: 2024-02-20

## 2024-03-04 ENCOUNTER — NON-APPOINTMENT (OUTPATIENT)
Age: 29
End: 2024-03-04

## 2024-03-04 NOTE — DISCUSSION/SUMMARY
[FreeTextEntry1] : RESULTS TRANSMISSION Sunitha Orlando is a 29-year-old male who was called on 03/04/2024 for a discussion regarding their genetic testing results related to hereditary cancer predisposition.   Mr. Orlando was originally seen at [facility] on 01/30/2024 for hereditary cancer predisposition risk assessment due to a personal history of lymphoma and a family history of cancer. We waited until staging of his lymphoma diagnosis was confirmed and genetic testing was ordered on 02/20/2024. Mr. Orlando decided to pursue genetic testing for genes associated with lymphoma, breast cancer, and gynecological cancers offered by [facility].  TEST RESULTS: NEGATIVE  No pathogenic (disease-causing) variants or VUSs were detected in the following genes:  ADA, VIBHA, BARD1, BLM, BRCA1, BRCA2, BRIP1, CARD11, CARMIL2, CASP8, CD27, CDH1, CHEK2, CTLA4, CTPS1, DOCK8, EPCAM, FADD, FAS, FASLG, FCHO1, IKZF1, IL2RA, IL2RB, ITK, MAGT1, MCM4, MLH1, MSH2, MSH6, NBN, NF1, PALB2, PIK3CD, PIK3R1, PMS2, PRKCD, PTEN, RAC2, RAD51C, RAD51D, RASGRP1, RHOH, RMRP, SH2D1A, STAT3, STK4, STK11, STXBP2, [id number], TP53, TPP2, WAS, and XIAP.  RESULTS INTERPRETATION AND ASSESSMENT: Given Mr. Orlando's personal and current reported family history of cancer, and his negative genetic test results, the following screening guidelines and risk-reducing recommendations were discussed:  HEMATOLOGY/ONCOLOGY: - Long-term management and surveillance should be based on Mr. Orlando's on- or post-treatment protocol as recommended by his oncology team.   OTHER:  - In the absence of other indications, Mr. Orlando should practice age-appropriate cancer screening of other organ systems as recommended for the general population.  We also discussed that, while no cause of the patient's personal and family history of cancer was identified, this result, while reassuring, does entirely not rule out a hereditary cancer risk in the patient. It is possible, although unlikely, the patient has a mutation in one of the genes tested that is not detectable by this analysis, or has a mutation in a different gene, either known or unknown. It is also possible there is a hereditary cancer predisposition in the family, but the patient did not inherit it.  We informed Mr. Orlando that our knowledge of genetics and inherited cancer conditions is changing rapidly. Therefore, we recommended that Mr. Orlando contact our office, every 2 to 3 years, to discuss relevant advances in cancer genetics.  We emphasized the importance of re-contacting us with updates regarding his personal and family history of cancer as well as any updates regarding additional cancer genetic test results performed for the patient and/or family members.  Such updates could possibly change our risk assessment and recommendations.   In addition, we discussed Mr. Orlando's paternal first cousin who was diagnosed with breast cancer could consider pursuing cancer risk assessment genetic counseling with the option of genetic testing.   PLAN: 1.See above for recommended screening and risk-reduction strategies. 2. Patient informed consult note(s) will be available through their [facility] patient portal and genetic test results will be released via [facility]'s laboratory portal.  3. Mr. Orlando was encouraged to contact us every 2-3 years to discuss relevant advances in cancer genetics, or sooner if there are any changes in his personal or family history of cancer.   For any additional questions please call [facility] at [phone].    Stella Rob MS, [facility], [facility]   CC:  Patient Dr. Jeronimo Syed

## 2024-03-20 ENCOUNTER — NON-APPOINTMENT (OUTPATIENT)
Age: 29
End: 2024-03-20

## 2025-01-15 NOTE — REASON FOR VISIT
Patient called he was seen in ed [facility] for cp   He is still having some discomfort .   [FreeTextEntry2] : gastric cancer

## (undated) DEVICE — MARKER ENDO SPOT EX

## (undated) DEVICE — BITE BLOCK ADULT 20 X 27MM (GREEN)

## (undated) DEVICE — NDL INTERJECT CLEAR 7F 23G 4MM 240CM

## (undated) DEVICE — Device

## (undated) DEVICE — UNDERPAD LINEN SAVER 17 X 24"

## (undated) DEVICE — CONTAINER FORMALIN 10% 20ML

## (undated) DEVICE — SYR LUER LOK 3CC

## (undated) DEVICE — DRSG 2X2

## (undated) DEVICE — SOLIDIFIER ISOLYZER 2000 CC

## (undated) DEVICE — FORCEP RADIAL JAW 4 W NDL 2.2MM 2.8MM 240CM ORANGE DISP

## (undated) DEVICE — SALIVA EJECTOR (BLUE)

## (undated) DEVICE — VENODYNE/SCD SLEEVE CALF MEDIUM

## (undated) DEVICE — ANESTHESIA CIRCUIT ADULT HMEF

## (undated) DEVICE — FORMALIN CONTAINER MED

## (undated) DEVICE — NDL HYPO SAFE 22G X 1" (BLACK)

## (undated) DEVICE — LABELS BLANK W PEN

## (undated) DEVICE — TUBING ENDO EXT OLYMPUS 160 24HR USE GI

## (undated) DEVICE — CATH IV SAFE BC 22G X 1" (BLUE)

## (undated) DEVICE — WARMING BLANKET FULL ADULT

## (undated) DEVICE — SOL IRR POUR NS 0.9% 500ML

## (undated) DEVICE — PACK IV START WITH CHG

## (undated) DEVICE — SOL INJ NS 0.9% 500ML 1-PORT

## (undated) DEVICE — DRSG CURITY GAUZE SPONGE 4 X 4" 12-PLY NON-STERILE

## (undated) DEVICE — GOWN LG

## (undated) DEVICE — TUBING MEDI-VAC W MAXIGRIP CONNECTORS 1/4"X6'

## (undated) DEVICE — TUBING IV SET GRAVITY 1Y 78" MACRO

## (undated) DEVICE — DENTURE CUP PINK